# Patient Record
Sex: FEMALE | Race: WHITE
[De-identification: names, ages, dates, MRNs, and addresses within clinical notes are randomized per-mention and may not be internally consistent; named-entity substitution may affect disease eponyms.]

---

## 2017-02-06 ENCOUNTER — HOSPITAL ENCOUNTER (OUTPATIENT)
Dept: HOSPITAL 53 - M SMT | Age: 82
End: 2017-02-06
Attending: UROLOGY
Payer: MEDICARE

## 2017-02-06 DIAGNOSIS — Z87.440: Primary | ICD-10-CM

## 2017-02-06 LAB
ANION GAP SERPL CALC-SCNC: 9 MEQ/L (ref 8–16)
BUN SERPL-MCNC: 26 MG/DL (ref 7–18)
CALCIUM SERPL-MCNC: 8.9 MG/DL (ref 8.8–10.2)
CHLORIDE SERPL-SCNC: 106 MEQ/L (ref 98–107)
CO2 SERPL-SCNC: 27 MEQ/L (ref 21–32)
CREAT SERPL-MCNC: 0.9 MG/DL (ref 0.55–1.02)
ERYTHROCYTE [DISTWIDTH] IN BLOOD BY AUTOMATED COUNT: 13 % (ref 11.5–14.5)
GFR SERPL CREATININE-BSD FRML MDRD: > 60 ML/MIN/{1.73_M2} (ref 32–?)
GLUCOSE SERPL-MCNC: 93 MG/DL (ref 83–110)
MCH RBC QN AUTO: 29.2 PG (ref 27–33)
MCHC RBC AUTO-ENTMCNC: 33.5 G/DL (ref 32–36.5)
MCV RBC AUTO: 87.2 FL (ref 80–96)
PLATELET # BLD AUTO: 207 K/MM3 (ref 150–450)
POTASSIUM SERPL-SCNC: 4 MEQ/L (ref 3.5–5.1)
SODIUM SERPL-SCNC: 142 MEQ/L (ref 136–145)
WBC # BLD AUTO: 6.8 K/MM3 (ref 4–10)

## 2017-02-10 ENCOUNTER — HOSPITAL ENCOUNTER (OUTPATIENT)
Dept: HOSPITAL 53 - M RAD | Age: 82
End: 2017-02-10
Attending: UROLOGY
Payer: MEDICARE

## 2017-02-10 DIAGNOSIS — Z87.440: Primary | ICD-10-CM

## 2017-02-10 DIAGNOSIS — N20.0: ICD-10-CM

## 2017-02-10 DIAGNOSIS — N28.1: ICD-10-CM

## 2017-02-10 DIAGNOSIS — K57.30: ICD-10-CM

## 2017-02-10 PROCEDURE — 74178 CT ABD&PLV WO CNTR FLWD CNTR: CPT

## 2017-02-10 NOTE — REP
CT ABDOMEN PELVIS WITHOUT AND WITH CONTRAST:  02/10/2017.

 

Clinical history:  Recurrent urinary tract infections.

 

Comparison:  CT abdomen pelvis 06/27/2010, renal ultrasound 10/11/2016.

 

Technique:  Axial scanning through the abdomen and pelvis followed by bolus of

100 mL of Isovue-370 and rescanning through the abdomen pelvis and equilibrium

phase and delayed phase.  Coronal and sagittal reconstructions were also provided

to the delayed imaging phase.

 

Findings: CT abdomen:  Lung bases show some curvilinear fiber atelectatic change

in both lower lung zones.  No infiltrate or effusion.  No nodular mass.  Heart

not enlarged. There is left atrial enlargement. No pericardial thickening or

effusion.  No hiatal hernia.  Liver has a vertical diameter 17 cm, not grossly

enlarged.  There are clips from prior cholecystectomy.  I see no intrahepatic

biliary dilatation.  No hepatic mass, cyst or perihepatic ascites.  I see no

splenomegaly or focal splenic lesion nor adjacent ascites.  The adrenal glands

show slight thickening of the limbs bilaterally suggesting adrenal hyperplasia.

Stomach only minimal retained fluid and no mass or focal lesion.  The aorta is

intact without aneurysm or dissection.  There is a retroaortic left renal vein as

anatomic variation.  Parapelvic cysts are again seen bilaterally.  These are much

larger left than right. Collecting system is splayed around the parapelvic cyst

but without dilatation on the delayed images.  The largest of these is the left

renal hilus at 3.7 x 3 x 3 cm.  I see no evidence of stone or solid mass. There

are small cortical cysts evident on the right and left.  There is no evidence of

solid mass.  There was normal cortical enhancement on both sides in a symmetric

fashion aside from the cysts.  No perinephric edema.  I see no ureteral

dilatation or stone on either side in the ureter.  The right kidney shows no

stone.  The left kidney shows a few small stones in lower pole about 6 mm for

those two stones.  No other findings in the kidneys.  No periaortic other

retroperitoneal pathologic sized lymphadenopathy.  Small bowel loops grossly

intact. There is diverticulosis without diverticulitis or colitis in the abdomen

proper.  Bone windows show degenerative disc changes throughout greatest at L4-5

with vacuum phenomena at least at L5-S1. Lower thoracic spine shows degenerative

changes.  No compression deformity, spondylolysis or spondylolisthesis.  There is

facet arthritis.  Visualized ribs were intact.

 

CT pelvis:  The bony pelvis shows degenerative changes of both hips with chronic

thickening and coarsening of trabeculae in the left iliac wing and acetabular

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  Appearance unchanged from an AP pelvis

and hip series 11/23/2011.  Extensive  sigmoid and distal left colonic

diverticulosis without diverticulitis.  Bladder partially filled.  A few small

air bubbles seen within it.  There is pelvic phleboliths evident.  There is a no

evidence of ascites or adenopathy in deep pelvis.  Small bowel loops

unremarkable.  The cecum unremarkable without inflammatory changes adjacent.  I

see no ventral or inguinal hernia nor pathologic sized inguinal adenopathy.

Vaginal cuff intact.  No adnexal or pelvic mass.

 

Impression:

 

1.  Multiple parapelvic cysts bilaterally, left larger than right splaying the

collecting system on the left side and delayed images, but not causing

hydronephrosis.  There are two small stones about 6 mm each lower pole left

kidney, none on the right.  No hydroureter or ureteral stone.

 

2.  Bladder with a few small air bubbles within without definite evidence for

colovesical fistula. The air bubbles may be related to gas forming organism from

UTI or recent catheterization.

 

3.  Prior hysterectomy without pelvic mass. There is extensive diverticulosis

distal left colon, sigmoid without diverticulitis.

 

4. Prior cholecystectomy.

 

 

 

 

Signed by

Wilbur Eric MD 02/10/2017 08:04 P

## 2017-08-21 ENCOUNTER — HOSPITAL ENCOUNTER (EMERGENCY)
Dept: HOSPITAL 53 - M ED | Age: 82
Discharge: HOME | End: 2017-08-21
Payer: MEDICARE

## 2017-08-21 VITALS — DIASTOLIC BLOOD PRESSURE: 72 MMHG | SYSTOLIC BLOOD PRESSURE: 148 MMHG

## 2017-08-21 VITALS — WEIGHT: 140.3 LBS | BODY MASS INDEX: 27.55 KG/M2 | HEIGHT: 60 IN

## 2017-08-21 DIAGNOSIS — Z79.899: ICD-10-CM

## 2017-08-21 DIAGNOSIS — I10: ICD-10-CM

## 2017-08-21 DIAGNOSIS — Z88.8: ICD-10-CM

## 2017-08-21 DIAGNOSIS — Z88.5: ICD-10-CM

## 2017-08-21 DIAGNOSIS — R42: Primary | ICD-10-CM

## 2017-08-21 LAB
ANION GAP SERPL CALC-SCNC: 9 MEQ/L (ref 8–16)
BUN SERPL-MCNC: 23 MG/DL (ref 7–18)
CALCIUM SERPL-MCNC: 8.6 MG/DL (ref 8.8–10.2)
CHLORIDE SERPL-SCNC: 108 MEQ/L (ref 98–107)
CO2 SERPL-SCNC: 28 MEQ/L (ref 21–32)
CREAT SERPL-MCNC: 0.76 MG/DL (ref 0.55–1.02)
EOSINOPHIL NFR BLD MANUAL: 3 % (ref 0–5)
ERYTHROCYTE [DISTWIDTH] IN BLOOD BY AUTOMATED COUNT: 13 % (ref 11.5–14.5)
GFR SERPL CREATININE-BSD FRML MDRD: > 60 ML/MIN/{1.73_M2} (ref 32–?)
GLUCOSE SERPL-MCNC: 70 MG/DL (ref 83–110)
MCH RBC QN AUTO: 29.5 PG (ref 27–33)
MCHC RBC AUTO-ENTMCNC: 33.7 G/DL (ref 32–36.5)
MCV RBC AUTO: 87.4 FL (ref 80–96)
OVALOCYTES BLD QL SMEAR: (no result)
POTASSIUM SERPL-SCNC: 3.6 MEQ/L (ref 3.5–5.1)
SODIUM SERPL-SCNC: 145 MEQ/L (ref 136–145)
WBC # BLD AUTO: 5.4 K/MM3 (ref 4–10)

## 2017-08-21 NOTE — ECGEPIP
Stationary ECG Study

                           [facility] - ED

                                       

                                       Test Date:    2017

Pat Name:     POLLO BARNETT          Department:   

Patient ID:   [id number]                 Room:         -

Gender:       F                        Technician:   [email or web address]

:          [id number]               Requested By: CHINYERE MULTANI

Order Number: [id number]     Reading MD:   Jm Hall

                                 Measurements

Intervals                              Axis          

Rate:         75                       P:            -22

MA:           158                      QRS:          -29

QRSD:         82                       T:            19

QT:           360                                    

QTc:          404                                    

                           Interpretive Statements

SINUS RHYTHM

BORDERLINE LEFT AXIS DEVIATION

NO PRIORS

Electronically Signed On 2017 19:43:35 EDT by Jm Hall

## 2017-09-27 ENCOUNTER — HOSPITAL ENCOUNTER (OUTPATIENT)
Dept: HOSPITAL 53 - M SMT | Age: 82
End: 2017-09-27
Attending: UROLOGY
Payer: MEDICARE

## 2017-09-27 DIAGNOSIS — Z87.440: Primary | ICD-10-CM

## 2017-10-20 ENCOUNTER — HOSPITAL ENCOUNTER (OUTPATIENT)
Dept: HOSPITAL 53 - M PT | Age: 82
LOS: 11 days | Discharge: HOME | End: 2017-10-31
Attending: OTOLARYNGOLOGY
Payer: MEDICARE

## 2017-10-20 DIAGNOSIS — Z51.89: Primary | ICD-10-CM

## 2017-10-20 DIAGNOSIS — H81.10: ICD-10-CM

## 2017-10-20 PROCEDURE — 97112 NEUROMUSCULAR REEDUCATION: CPT

## 2017-10-20 PROCEDURE — 97530 THERAPEUTIC ACTIVITIES: CPT

## 2017-10-20 PROCEDURE — 97162 PT EVAL MOD COMPLEX 30 MIN: CPT

## 2017-10-23 ENCOUNTER — HOSPITAL ENCOUNTER (OUTPATIENT)
Dept: HOSPITAL 53 - M WUC | Age: 82
End: 2017-10-23
Attending: NURSE PRACTITIONER
Payer: MEDICARE

## 2017-10-23 DIAGNOSIS — E55.9: ICD-10-CM

## 2017-10-23 DIAGNOSIS — Z79.899: Primary | ICD-10-CM

## 2017-10-23 DIAGNOSIS — I10: ICD-10-CM

## 2017-10-23 DIAGNOSIS — R94.6: ICD-10-CM

## 2017-10-23 LAB
ALBUMIN SERPL BCG-MCNC: 3.1 GM/DL (ref 3.2–5.2)
ALBUMIN/GLOB SERPL: 0.97 {RATIO} (ref 1–1.93)
ALP SERPL-CCNC: 125 U/L (ref 45–117)
ALT SERPL W P-5'-P-CCNC: 34 U/L (ref 12–78)
ANION GAP SERPL CALC-SCNC: 6 MEQ/L (ref 8–16)
AST SERPL-CCNC: 21 U/L (ref 15–37)
BASOPHILS # BLD AUTO: 0 10^3/UL (ref 0–0.2)
BASOPHILS NFR BLD AUTO: 0.4 % (ref 0–1)
BILIRUB SERPL-MCNC: 0.4 MG/DL (ref 0.2–1)
BUN SERPL-MCNC: 25 MG/DL (ref 7–18)
CALCIUM SERPL-MCNC: 8.6 MG/DL (ref 8.8–10.2)
CHLORIDE SERPL-SCNC: 111 MEQ/L (ref 98–107)
CHOLEST SERPL-MCNC: 172 MG/DL (ref ?–200)
CO2 SERPL-SCNC: 27 MEQ/L (ref 21–32)
CREAT SERPL-MCNC: 0.78 MG/DL (ref 0.55–1.02)
EOSINOPHIL # BLD AUTO: 0.2 10^3/UL (ref 0–0.5)
EOSINOPHIL NFR BLD AUTO: 4.1 % (ref 0–3)
ERYTHROCYTE [DISTWIDTH] IN BLOOD BY AUTOMATED COUNT: 13.6 % (ref 11.5–14.5)
EST. AVERAGE GLUCOSE BLD GHB EST-MCNC: 114 MG/DL (ref 60–110)
GFR SERPL CREATININE-BSD FRML MDRD: > 60 ML/MIN/{1.73_M2} (ref 32–?)
GLUCOSE SERPL-MCNC: 80 MG/DL (ref 83–110)
IMM GRANULOCYTES NFR BLD: 0.8 % (ref 0–0)
LYMPHOCYTES # BLD AUTO: 1.6 10^3/UL (ref 1.5–4.5)
LYMPHOCYTES NFR BLD AUTO: 33.1 % (ref 24–44)
MCH RBC QN AUTO: 28.7 PG (ref 27–33)
MCHC RBC AUTO-ENTMCNC: 32.2 G/DL (ref 32–36.5)
MCV RBC AUTO: 89 FL (ref 80–96)
MONOCYTES # BLD AUTO: 0.5 10^3/UL (ref 0–0.8)
MONOCYTES NFR BLD AUTO: 9.9 % (ref 0–5)
NEUTROPHILS # BLD AUTO: 2.5 10^3/UL (ref 1.8–7.7)
NEUTROPHILS NFR BLD AUTO: 51.7 % (ref 36–66)
NRBC BLD AUTO-RTO: 0 % (ref 0–0)
PLATELET # BLD AUTO: 188 10^3/UL (ref 150–450)
POTASSIUM SERPL-SCNC: 4.7 MEQ/L (ref 3.5–5.1)
PROT SERPL-MCNC: 6.3 GM/DL (ref 6.4–8.2)
SODIUM SERPL-SCNC: 144 MEQ/L (ref 136–145)
T4 FREE SERPL-MCNC: 1.13 NG/DL (ref 0.76–1.46)
TRIGL SERPL-MCNC: 93 MG/DL (ref ?–150)
WBC # BLD AUTO: 4.8 10^3/UL (ref 4–10)

## 2018-01-31 ENCOUNTER — HOSPITAL ENCOUNTER (OUTPATIENT)
Dept: HOSPITAL 53 - M WUC | Age: 83
End: 2018-01-31
Attending: NURSE PRACTITIONER
Payer: MEDICARE

## 2018-01-31 DIAGNOSIS — E55.9: ICD-10-CM

## 2018-01-31 DIAGNOSIS — K57.90: ICD-10-CM

## 2018-01-31 DIAGNOSIS — Z79.899: ICD-10-CM

## 2018-01-31 DIAGNOSIS — I10: Primary | ICD-10-CM

## 2018-01-31 DIAGNOSIS — N39.0: ICD-10-CM

## 2018-01-31 DIAGNOSIS — R94.6: ICD-10-CM

## 2018-01-31 LAB
25(OH)D3 SERPL-MCNC: 44.3 NG/ML (ref 30–100)
ALBUMIN/GLOBULIN RATIO: 0.87 (ref 1–1.93)
ALBUMIN: 3.4 GM/DL (ref 3.2–5.2)
ALKALINE PHOSPHATASE: 120 U/L (ref 45–117)
ALT SERPL W P-5'-P-CCNC: 18 U/L (ref 12–78)
ANION GAP: 7 MEQ/L (ref 8–16)
AST SERPL-CCNC: 16 U/L (ref 7–37)
BASO #: 0 10^3/UL (ref 0–0.2)
BASO %: 0.7 % (ref 0–1)
BILIRUBIN,TOTAL: 0.4 MG/DL (ref 0.2–1)
BLOOD UREA NITROGEN: 29 MG/DL (ref 7–18)
CALCIUM LEVEL: 8.7 MG/DL (ref 8.8–10.2)
CARBON DIOXIDE LEVEL: 28 MEQ/L (ref 21–32)
CHLORIDE LEVEL: 107 MEQ/L (ref 98–107)
CHOLEST SERPL-MCNC: 155 MG/DL (ref ?–200)
CHOLESTEROL RISK RATIO: 2.31 (ref ?–5)
CREATININE FOR GFR: 0.84 MG/DL (ref 0.55–1.3)
EOS #: 0.2 10^3/UL (ref 0–0.5)
EOSINOPHIL NFR BLD AUTO: 3.9 % (ref 0–3)
EST. AVERAGE GLUCOSE BLD GHB EST-MCNC: 105 MG/DL (ref 60–110)
FREE T4: 1.43 NG/DL (ref 0.76–1.46)
GFR SERPL CREATININE-BSD FRML MDRD: > 60 ML/MIN/{1.73_M2} (ref 32–?)
GLUCOSE, FASTING: 89 MG/DL (ref 70–100)
HDLC SERPL-MCNC: 67 MG/DL (ref 40–?)
HEMATOCRIT: 34.9 % (ref 36–47)
HEMOGLOBIN: 11.4 G/DL (ref 12–16)
IMMATURE GRANULOCYTE #: 0 10^3/UL (ref 0–0)
IMMATURE GRANULOCYTE %: 0.4 % (ref 0–0)
LDL CHOLESTEROL: 66.4 MG/DL (ref ?–100)
LYMPH #: 1.6 10^3/UL (ref 1.5–4.5)
LYMPH %: 27.3 % (ref 24–44)
MEAN CORPUSCULAR HEMOGLOBIN: 28.9 PG (ref 27–33)
MEAN CORPUSCULAR HGB CONC: 32.7 G/DL (ref 32–36.5)
MEAN CORPUSCULAR VOLUME: 88.4 FL (ref 80–96)
MONO #: 0.6 10^3/UL (ref 0–0.8)
MONO %: 9.8 % (ref 0–5)
NEUTROPHILS #: 3.3 10^3/UL (ref 1.8–7.7)
NEUTROPHILS %: 57.9 % (ref 36–66)
NONHDLC SERPL-MCNC: 88 MG/DL
NRBC BLD AUTO-RTO: 0 % (ref 0–0)
PLATELET COUNT, AUTOMATED: 208 10^3/UL (ref 150–450)
POTASSIUM SERUM: 4.4 MEQ/L (ref 3.5–5.1)
RED BLOOD COUNT: 3.95 10^6/UL (ref 4–5.4)
RED CELL DISTRIBUTION WIDTH: 13.1 % (ref 11.5–14.5)
SODIUM LEVEL: 142 MEQ/L (ref 136–145)
THYROID STIMULATING HORMONE: 0.29 UIU/ML (ref 0.36–3.74)
TOTAL PROTEIN: 7.3 GM/DL (ref 6.4–8.2)
TRIGLYCERIDES LEVEL: 108 MG/DL (ref ?–150)
WHITE BLOOD COUNT: 5.7 10^3/UL (ref 4–10)

## 2018-01-31 PROCEDURE — 84443 ASSAY THYROID STIM HORMONE: CPT

## 2018-04-13 ENCOUNTER — HOSPITAL ENCOUNTER (OUTPATIENT)
Dept: HOSPITAL 53 - M SMT | Age: 83
End: 2018-04-13
Attending: UROLOGY
Payer: MEDICARE

## 2018-04-13 DIAGNOSIS — Z87.440: Primary | ICD-10-CM

## 2018-04-13 DIAGNOSIS — Z79.899: ICD-10-CM

## 2018-04-13 LAB
APPEARANCE, URINE: (no result)
BACTERIA UR QL AUTO: (no result)
BILIRUBIN, URINE AUTO: NEGATIVE
BLOOD, URINE BLOOD: NEGATIVE
GLUCOSE, URINE (UA) AUTO: NEGATIVE MG/DL
KETONE, URINE AUTO: NEGATIVE MG/DL
LEUKOCYTE ESTERASE UR QL STRIP.AUTO: (no result)
MUCUS, URINE: (no result)
NITRITE, URINE AUTO: NEGATIVE
PH,URINE: 5 UNITS (ref 5–9)
PROT UR QL STRIP.AUTO: NEGATIVE MG/DL
RBC, URINE AUTO: 3 /HPF (ref 0–3)
SPECIFIC GRAVITY URINE AUTO: 1.01 (ref 1–1.03)
SQUAMOUS #/AREA URNS AUTO: 3 /HPF (ref 0–6)
URIC ACID CRYSTALS: (no result)
UROBILINOGEN, URINE AUTO: 0.2 MG/DL (ref 0–2)
WBC, URINE AUTO: 11 /HPF (ref 0–3)

## 2018-04-13 PROCEDURE — 81001 URINALYSIS AUTO W/SCOPE: CPT

## 2018-04-19 ENCOUNTER — HOSPITAL ENCOUNTER (OUTPATIENT)
Dept: HOSPITAL 53 - M SMT | Age: 83
End: 2018-04-19
Attending: UROLOGY
Payer: MEDICARE

## 2018-04-19 DIAGNOSIS — N39.0: Primary | ICD-10-CM

## 2018-04-19 LAB
APPEARANCE, URINE: (no result)
BACTERIA UR QL AUTO: NEGATIVE
BILIRUBIN, URINE AUTO: NEGATIVE
BLOOD, URINE BLOOD: NEGATIVE
CAOX CRY URNS QL MICRO: (no result)
GLUCOSE, URINE (UA) AUTO: NEGATIVE MG/DL
KETONE, URINE AUTO: NEGATIVE MG/DL
LEUKOCYTE ESTERASE UR QL STRIP.AUTO: (no result)
MUCUS, URINE: (no result)
NITRITE, URINE AUTO: NEGATIVE
PH,URINE: 5 UNITS (ref 5–9)
PROT UR QL STRIP.AUTO: NEGATIVE MG/DL
RBC, URINE AUTO: 0 /HPF (ref 0–3)
SPECIFIC GRAVITY URINE AUTO: 1.02 (ref 1–1.03)
SQUAMOUS #/AREA URNS AUTO: 2 /HPF (ref 0–6)
UROBILINOGEN, URINE AUTO: 0.2 MG/DL (ref 0–2)
WBC, URINE AUTO: 6 /HPF (ref 0–3)

## 2018-04-19 PROCEDURE — 81001 URINALYSIS AUTO W/SCOPE: CPT

## 2018-06-01 ENCOUNTER — HOSPITAL ENCOUNTER (OUTPATIENT)
Dept: HOSPITAL 53 - M SMT | Age: 83
End: 2018-06-01
Attending: UROLOGY
Payer: MEDICARE

## 2018-06-01 DIAGNOSIS — R30.0: Primary | ICD-10-CM

## 2018-06-01 LAB
AMORPH SED URNS QL MICRO: (no result)
APPEARANCE, URINE: (no result)
BACTERIA UR QL AUTO: (no result)
BILIRUBIN, URINE AUTO: NEGATIVE
BLOOD, URINE BLOOD: NEGATIVE
GLUCOSE, URINE (UA) AUTO: NEGATIVE MG/DL
KETONE, URINE AUTO: NEGATIVE MG/DL
LEUKOCYTE ESTERASE UR QL STRIP.AUTO: (no result)
NITRITE, URINE AUTO: NEGATIVE
PH,URINE: 5 UNITS (ref 5–9)
PROT UR QL STRIP.AUTO: NEGATIVE MG/DL
RBC, URINE AUTO: 8 /HPF (ref 0–3)
SPECIFIC GRAVITY URINE AUTO: 1.01 (ref 1–1.03)
SQUAMOUS #/AREA URNS AUTO: 4 /HPF (ref 0–6)
TRANSITIONAL EPITHELIAL AUTO: <1 /HPF
UROBILINOGEN, URINE AUTO: 0.2 MG/DL (ref 0–2)
WBC, URINE AUTO: (no result) /HPF (ref 0–3)

## 2018-06-01 PROCEDURE — 81001 URINALYSIS AUTO W/SCOPE: CPT

## 2018-06-18 ENCOUNTER — HOSPITAL ENCOUNTER (OUTPATIENT)
Dept: HOSPITAL 53 - M SMT | Age: 83
End: 2018-06-18
Attending: UROLOGY
Payer: MEDICARE

## 2018-06-18 DIAGNOSIS — N30.00: Primary | ICD-10-CM

## 2018-06-18 LAB
APPEARANCE, URINE: (no result)
BACTERIA UR QL AUTO: (no result)
BILIRUBIN, URINE AUTO: NEGATIVE
BLOOD, URINE BLOOD: NEGATIVE
GLUCOSE, URINE (UA) AUTO: NEGATIVE MG/DL
KETONE, URINE AUTO: NEGATIVE MG/DL
LEUKOCYTE ESTERASE UR QL STRIP.AUTO: (no result)
MUCUS, URINE: (no result)
NITRITE, URINE AUTO: NEGATIVE
PH,URINE: 5 UNITS (ref 5–9)
PROT UR QL STRIP.AUTO: NEGATIVE MG/DL
RBC, URINE AUTO: 1 /HPF (ref 0–3)
SPECIFIC GRAVITY URINE AUTO: 1.01 (ref 1–1.03)
SQUAMOUS #/AREA URNS AUTO: 3 /HPF (ref 0–6)
UROBILINOGEN, URINE AUTO: 0.2 MG/DL (ref 0–2)
WBC, URINE AUTO: 15 /HPF (ref 0–3)

## 2018-06-18 PROCEDURE — 81001 URINALYSIS AUTO W/SCOPE: CPT

## 2018-07-26 ENCOUNTER — HOSPITAL ENCOUNTER (OUTPATIENT)
Dept: HOSPITAL 53 - M WUC | Age: 83
End: 2018-07-26
Attending: NURSE PRACTITIONER
Payer: MEDICARE

## 2018-07-26 DIAGNOSIS — Z79.899: ICD-10-CM

## 2018-07-26 DIAGNOSIS — R94.6: Primary | ICD-10-CM

## 2018-07-26 DIAGNOSIS — E03.9: ICD-10-CM

## 2018-07-26 DIAGNOSIS — E55.9: ICD-10-CM

## 2018-07-26 LAB
25(OH)D3 SERPL-MCNC: 46.1 NG/ML (ref 30–100)
ALBUMIN/GLOBULIN RATIO: 0.88 (ref 1–1.93)
ALBUMIN: 3.5 GM/DL (ref 3.2–5.2)
ALKALINE PHOSPHATASE: 130 U/L (ref 45–117)
ALT SERPL W P-5'-P-CCNC: 79 U/L (ref 12–78)
ANION GAP: 7 MEQ/L (ref 8–16)
AST SERPL-CCNC: 43 U/L (ref 7–37)
BASO #: 0 10^3/UL (ref 0–0.2)
BASO %: 0.5 % (ref 0–1)
BILIRUBIN,TOTAL: 0.4 MG/DL (ref 0.2–1)
BLOOD UREA NITROGEN: 37 MG/DL (ref 7–18)
CALCIUM LEVEL: 8.4 MG/DL (ref 8.8–10.2)
CARBON DIOXIDE LEVEL: 29 MEQ/L (ref 21–32)
CHLORIDE LEVEL: 103 MEQ/L (ref 98–107)
CHOLEST SERPL-MCNC: 152 MG/DL (ref ?–200)
CHOLESTEROL RISK RATIO: 2.03 (ref ?–5)
CREATININE FOR GFR: 1.26 MG/DL (ref 0.55–1.3)
EOS #: 0.2 10^3/UL (ref 0–0.5)
EOSINOPHIL NFR BLD AUTO: 3.1 % (ref 0–3)
EST. AVERAGE GLUCOSE BLD GHB EST-MCNC: 103 MG/DL (ref 60–110)
FREE T4: 1.46 NG/DL (ref 0.76–1.46)
GFR SERPL CREATININE-BSD FRML MDRD: 42.6 ML/MIN/{1.73_M2} (ref 32–?)
GLUCOSE, FASTING: 91 MG/DL (ref 70–100)
HDLC SERPL-MCNC: 75 MG/DL (ref 40–?)
HEMATOCRIT: 33.9 % (ref 36–47)
HEMOGLOBIN: 11.1 G/DL (ref 12–15.5)
IMMATURE GRANULOCYTE #: 0 10^3/UL (ref 0–0)
IMMATURE GRANULOCYTE %: 0.5 % (ref 0–3)
LDL CHOLESTEROL: 54.2 MG/DL (ref ?–100)
LYMPH #: 2 10^3/UL (ref 1.5–4.5)
LYMPH %: 31.1 % (ref 24–44)
MEAN CORPUSCULAR HEMOGLOBIN: 28.2 PG (ref 27–33)
MEAN CORPUSCULAR HGB CONC: 32.7 G/DL (ref 32–36.5)
MEAN CORPUSCULAR VOLUME: 86 FL (ref 80–96)
MONO #: 0.4 10^3/UL (ref 0–0.8)
MONO %: 6.8 % (ref 0–5)
NEUTROPHILS #: 3.7 10^3/UL (ref 1.8–7.7)
NEUTROPHILS %: 58 % (ref 36–66)
NONHDLC SERPL-MCNC: 77 MG/DL
NRBC BLD AUTO-RTO: 0 % (ref 0–0)
PLATELET COUNT, AUTOMATED: 214 10^3/UL (ref 150–450)
POTASSIUM SERUM: 4.4 MEQ/L (ref 3.5–5.1)
RED BLOOD COUNT: 3.94 10^6/UL (ref 4–5.4)
RED CELL DISTRIBUTION WIDTH: 14.2 % (ref 11.5–14.5)
SODIUM LEVEL: 139 MEQ/L (ref 136–145)
THYROID STIMULATING HORMONE: 1.01 UIU/ML (ref 0.36–3.74)
TOTAL PROTEIN: 7.5 GM/DL (ref 6.4–8.2)
TRIGLYCERIDES LEVEL: 114 MG/DL (ref ?–150)
WHITE BLOOD COUNT: 6.4 10^3/UL (ref 4–10)

## 2018-07-26 PROCEDURE — 84443 ASSAY THYROID STIM HORMONE: CPT

## 2018-08-19 ENCOUNTER — HOSPITAL ENCOUNTER (OUTPATIENT)
Dept: HOSPITAL 53 - M LAB REF | Age: 83
End: 2018-08-19
Attending: PHYSICIAN ASSISTANT
Payer: MEDICARE

## 2018-08-19 DIAGNOSIS — N39.0: Primary | ICD-10-CM

## 2018-08-19 PROCEDURE — 87086 URINE CULTURE/COLONY COUNT: CPT

## 2018-10-11 ENCOUNTER — HOSPITAL ENCOUNTER (OUTPATIENT)
Dept: HOSPITAL 53 - M SMT | Age: 83
End: 2018-10-11
Attending: UROLOGY
Payer: MEDICARE

## 2018-10-11 DIAGNOSIS — N39.0: Primary | ICD-10-CM

## 2018-10-11 LAB
APPEARANCE, URINE: (no result)
BACTERIA UR QL AUTO: (no result)
BILIRUBIN, URINE AUTO: NEGATIVE
BLOOD, URINE BLOOD: NEGATIVE
GLUCOSE, URINE (UA) AUTO: NEGATIVE MG/DL
KETONE, URINE AUTO: NEGATIVE MG/DL
LEUKOCYTE ESTERASE UR QL STRIP.AUTO: (no result)
NITRITE, URINE AUTO: NEGATIVE
PH,URINE: 5 UNITS (ref 5–9)
PROT UR QL STRIP.AUTO: NEGATIVE MG/DL
RBC, URINE AUTO: 2 /HPF (ref 0–3)
SPECIFIC GRAVITY URINE AUTO: 1.02 (ref 1–1.03)
SQUAMOUS #/AREA URNS AUTO: 2 /HPF (ref 0–6)
UROBILINOGEN, URINE AUTO: 0.2 MG/DL (ref 0–2)
WBC, URINE AUTO: 4 /HPF (ref 0–3)

## 2018-10-11 PROCEDURE — 81001 URINALYSIS AUTO W/SCOPE: CPT

## 2018-11-07 ENCOUNTER — HOSPITAL ENCOUNTER (OUTPATIENT)
Dept: HOSPITAL 53 - M WUC | Age: 83
End: 2018-11-07
Attending: PHYSICIAN ASSISTANT
Payer: MEDICARE

## 2018-11-07 DIAGNOSIS — E78.2: ICD-10-CM

## 2018-11-07 DIAGNOSIS — E03.9: Primary | ICD-10-CM

## 2018-11-07 DIAGNOSIS — I10: ICD-10-CM

## 2018-11-07 LAB
ALBUMIN/GLOBULIN RATIO: 1 (ref 1–1.93)
ALBUMIN: 3.4 GM/DL (ref 3.2–5.2)
ALKALINE PHOSPHATASE: 123 U/L (ref 45–117)
ALT SERPL W P-5'-P-CCNC: 21 U/L (ref 12–78)
ANION GAP: 7 MEQ/L (ref 8–16)
AST SERPL-CCNC: 18 U/L (ref 7–37)
BASO #: 0 10^3/UL (ref 0–0.2)
BASO %: 0.3 % (ref 0–1)
BILIRUBIN,TOTAL: 0.4 MG/DL (ref 0.2–1)
BLOOD UREA NITROGEN: 34 MG/DL (ref 7–18)
CALCIUM LEVEL: 8.6 MG/DL (ref 8.8–10.2)
CARBON DIOXIDE LEVEL: 29 MEQ/L (ref 21–32)
CHLORIDE LEVEL: 104 MEQ/L (ref 98–107)
CHOLEST SERPL-MCNC: 151 MG/DL (ref ?–200)
CHOLESTEROL RISK RATIO: 2.13 (ref ?–5)
CREATININE FOR GFR: 1.11 MG/DL (ref 0.55–1.3)
EOS #: 0.2 10^3/UL (ref 0–0.5)
EOSINOPHIL NFR BLD AUTO: 2.9 % (ref 0–3)
FREE T4: 1.4 NG/DL (ref 0.76–1.46)
GFR SERPL CREATININE-BSD FRML MDRD: 49.3 ML/MIN/{1.73_M2} (ref 32–?)
GLUCOSE, FASTING: 86 MG/DL (ref 70–100)
HDLC SERPL-MCNC: 71 MG/DL (ref 40–?)
HEMATOCRIT: 33.6 % (ref 36–47)
HEMOGLOBIN: 10.9 G/DL (ref 12–15.5)
IMMATURE GRANULOCYTE %: 0.5 % (ref 0–3)
LDL CHOLESTEROL: 57 MG/DL (ref ?–100)
LYMPH #: 1.8 10^3/UL (ref 1.5–4.5)
LYMPH %: 27.3 % (ref 24–44)
MEAN CORPUSCULAR HEMOGLOBIN: 29.2 PG (ref 27–33)
MEAN CORPUSCULAR HGB CONC: 32.4 G/DL (ref 32–36.5)
MEAN CORPUSCULAR VOLUME: 90.1 FL (ref 80–96)
MONO #: 0.5 10^3/UL (ref 0–0.8)
MONO %: 7.4 % (ref 0–5)
NEUTROPHILS #: 4.1 10^3/UL (ref 1.8–7.7)
NEUTROPHILS %: 61.6 % (ref 36–66)
NONHDLC SERPL-MCNC: 80 MG/DL
NRBC BLD AUTO-RTO: 0 % (ref 0–0)
PLATELET COUNT, AUTOMATED: 202 10^3/UL (ref 150–450)
POTASSIUM SERUM: 4.6 MEQ/L (ref 3.5–5.1)
RED BLOOD COUNT: 3.73 10^6/UL (ref 4–5.4)
RED CELL DISTRIBUTION WIDTH: 13.1 % (ref 11.5–14.5)
SODIUM LEVEL: 140 MEQ/L (ref 136–145)
THYROID STIMULATING HORMONE: 0.68 UIU/ML (ref 0.36–3.74)
TOTAL PROTEIN: 6.8 GM/DL (ref 6.4–8.2)
TRIGLYCERIDES LEVEL: 113 MG/DL (ref ?–150)
WHITE BLOOD COUNT: 6.6 10^3/UL (ref 4–10)

## 2018-11-07 PROCEDURE — 84443 ASSAY THYROID STIM HORMONE: CPT
